# Patient Record
Sex: MALE | Race: BLACK OR AFRICAN AMERICAN | NOT HISPANIC OR LATINO | Employment: UNEMPLOYED | URBAN - METROPOLITAN AREA
[De-identification: names, ages, dates, MRNs, and addresses within clinical notes are randomized per-mention and may not be internally consistent; named-entity substitution may affect disease eponyms.]

---

## 2024-10-15 ENCOUNTER — OFFICE VISIT (OUTPATIENT)
Dept: INTERNAL MEDICINE | Facility: CLINIC | Age: 29
End: 2024-10-15
Payer: COMMERCIAL

## 2024-10-15 VITALS
WEIGHT: 158.81 LBS | HEART RATE: 66 BPM | SYSTOLIC BLOOD PRESSURE: 127 MMHG | DIASTOLIC BLOOD PRESSURE: 79 MMHG | OXYGEN SATURATION: 98 % | TEMPERATURE: 98 F

## 2024-10-15 DIAGNOSIS — Z76.89 ENCOUNTER TO ESTABLISH CARE: Primary | ICD-10-CM

## 2024-10-15 PROCEDURE — 99203 OFFICE O/P NEW LOW 30 MIN: CPT | Mod: PBBFAC

## 2024-10-15 NOTE — PROGRESS NOTES
[facility] Internal Medicine Clinic Visit    Chief Complaint:      Establish Care (One episode of hematuria )     Subjective:     HPI:  Lake Gutierres Jr. is a 29 y.o. male with no past medical history presents to the clinic today to establish care.  Patient denies any medical issues.  Reports that he was healthy.  He does not smoke or drink alcohol.  No illicit drug use.  He was not on any medications.  No family history of cancer, cardiac disease, or kidney disease.  No family history at all.  Reports that both parents are healthy, as well as siblings.  Has not seen a provider in the past.      Review of Systems  Review of Systems   Constitutional:  Negative for chills and fever.   HENT:  Negative for hearing loss.    Eyes:  Negative for blurred vision.   Respiratory:  Negative for cough and shortness of breath.    Cardiovascular:  Negative for chest pain.   Gastrointestinal:  Negative for abdominal pain, diarrhea, nausea and vomiting.   Genitourinary:  Negative for dysuria.   Musculoskeletal:  Negative for myalgias.   Skin:  Negative for rash.   Neurological:  Negative for dizziness, weakness and headaches.        Objective:   Last 24 Hour Vital Signs:  Vitals  BP: 127/79  Temp: 98.2 °F (36.8 °C)  Temp Source: Oral  Pulse: 66  SpO2: 98 %  Weight: 72 kg (158 lb 12.8 oz)    Physical Exam  Constitutional:       Appearance: Normal appearance.   HENT:      Head: Normocephalic and atraumatic.      Nose: Nose normal. No congestion or rhinorrhea.      Mouth/Throat:      Mouth: Mucous membranes are moist.      Pharynx: Oropharynx is clear.   Eyes:      Conjunctiva/sclera: Conjunctivae normal.      Pupils: Pupils are equal, round, and reactive to light.   Cardiovascular:      Rate and Rhythm: Normal rate and regular rhythm.      Pulses: Normal pulses.      Heart sounds: Normal heart sounds. No murmur heard.     No friction rub. No gallop.   Pulmonary:      Effort: Pulmonary effort is normal. No respiratory distress.      Breath  sounds: Normal breath sounds. No wheezing or rales.   Abdominal:      General: Bowel sounds are normal.      Palpations: Abdomen is soft.   Musculoskeletal:         General: Normal range of motion.      Cervical back: Normal range of motion.      Right lower leg: No edema.      Left lower leg: No edema.   Skin:     General: Skin is warm.      Capillary Refill: Capillary refill takes less than 2 seconds.   Neurological:      General: No focal deficit present.      Mental Status: He is alert and oriented to person, place, and time. Mental status is at baseline.   Psychiatric:         Mood and Affect: Mood normal.         Behavior: Behavior normal.         Thought Content: Thought content normal.         Judgment: Judgment normal.          Labs  CMP:     ,   CBC:     ,   DM:     ,   FLP:      ,   Thyroid:     ,   Anemia:     ,   STD:     , Recent labs have been reviewed     Assessment & Plan:     Establish care  -will update health maintenance  -CBC, CMP, TSH, A1C, HIV, Hepatitis acute, and lipid panel ordered and pending    Vaccinations  -TDAP UTD 1/2022  -will discuss flu vaccination next visit    To be addressed at next follow-up  -Lab work      RTC in 6 months with labs.      Palomo Ghosh MD  Internal Medicine - PGY-3

## 2024-10-15 NOTE — PROGRESS NOTES
I have reviewed and concur with the resident's history, physical, assessment, and plan.  I have discussed with him all issues related to the diagnosis, workup and treatment plan. Care provided as reasonable and necessary.    Diego Acosta MD  [facility]